# Patient Record
Sex: FEMALE | Race: WHITE | HISPANIC OR LATINO | Employment: FULL TIME | URBAN - METROPOLITAN AREA
[De-identification: names, ages, dates, MRNs, and addresses within clinical notes are randomized per-mention and may not be internally consistent; named-entity substitution may affect disease eponyms.]

---

## 2023-03-09 PROBLEM — R14.0 BLOATING: Status: ACTIVE | Noted: 2023-03-09

## 2023-03-09 PROBLEM — R53.81 MALAISE AND FATIGUE: Status: ACTIVE | Noted: 2023-03-09

## 2023-03-09 PROBLEM — G62.9 NEUROPATHY: Status: ACTIVE | Noted: 2023-03-09

## 2023-03-09 PROBLEM — G43.009 MIGRAINE WITHOUT AURA AND WITHOUT STATUS MIGRAINOSUS, NOT INTRACTABLE: Status: ACTIVE | Noted: 2023-03-09

## 2023-03-09 PROBLEM — J02.9 SORE THROAT: Status: ACTIVE | Noted: 2023-03-09

## 2023-03-09 PROBLEM — M79.2 NERVE PAIN: Status: ACTIVE | Noted: 2023-03-09

## 2023-03-09 PROBLEM — J45.909 RAD (REACTIVE AIRWAY DISEASE) (HHS-HCC): Status: ACTIVE | Noted: 2023-03-09

## 2023-03-09 PROBLEM — K21.9 GASTROESOPHAGEAL REFLUX DISEASE WITHOUT ESOPHAGITIS: Status: ACTIVE | Noted: 2023-03-09

## 2023-03-09 PROBLEM — R53.83 MALAISE AND FATIGUE: Status: ACTIVE | Noted: 2023-03-09

## 2023-03-09 PROBLEM — H10.9 CONJUNCTIVITIS, BACTERIAL: Status: ACTIVE | Noted: 2023-03-09

## 2023-03-09 PROBLEM — N39.0 UTI (URINARY TRACT INFECTION): Status: ACTIVE | Noted: 2023-03-09

## 2023-03-09 PROBLEM — R14.3 FLATULENCE: Status: ACTIVE | Noted: 2023-03-09

## 2023-03-09 RX ORDER — ALBUTEROL SULFATE 90 UG/1
1-2 AEROSOL, METERED RESPIRATORY (INHALATION)
COMMUNITY
Start: 2022-08-02

## 2023-03-09 RX ORDER — GABAPENTIN 300 MG/1
1 CAPSULE ORAL 3 TIMES DAILY
COMMUNITY
Start: 2022-08-02 | End: 2023-03-15 | Stop reason: SDUPTHER

## 2023-03-09 RX ORDER — OMEPRAZOLE 20 MG/1
1 CAPSULE, DELAYED RELEASE ORAL DAILY
COMMUNITY
Start: 2022-08-19

## 2023-03-09 RX ORDER — DESOGESTREL AND ETHINYL ESTRADIOL 0.15-0.03
1 KIT ORAL DAILY
COMMUNITY
Start: 2022-09-09 | End: 2023-08-09 | Stop reason: SDUPTHER

## 2023-03-09 RX ORDER — POLYMYXIN B SULFATE AND TRIMETHOPRIM 1; 10000 MG/ML; [USP'U]/ML
1 SOLUTION OPHTHALMIC
COMMUNITY
Start: 2022-10-17

## 2023-03-09 RX ORDER — ATOGEPANT 60 MG/1
60 TABLET ORAL DAILY
COMMUNITY
Start: 2022-08-02 | End: 2023-03-15 | Stop reason: SDUPTHER

## 2023-03-15 ENCOUNTER — OFFICE VISIT (OUTPATIENT)
Dept: PRIMARY CARE | Facility: CLINIC | Age: 28
End: 2023-03-15
Payer: COMMERCIAL

## 2023-03-15 VITALS
HEIGHT: 64 IN | BODY MASS INDEX: 26.77 KG/M2 | OXYGEN SATURATION: 99 % | HEART RATE: 93 BPM | WEIGHT: 156.8 LBS | RESPIRATION RATE: 16 BRPM | SYSTOLIC BLOOD PRESSURE: 114 MMHG | TEMPERATURE: 98.3 F | DIASTOLIC BLOOD PRESSURE: 76 MMHG

## 2023-03-15 DIAGNOSIS — R53.81 MALAISE AND FATIGUE: ICD-10-CM

## 2023-03-15 DIAGNOSIS — M77.9 TENDONITIS: ICD-10-CM

## 2023-03-15 DIAGNOSIS — G62.9 NEUROPATHY: ICD-10-CM

## 2023-03-15 DIAGNOSIS — G43.009 MIGRAINE WITHOUT AURA AND WITHOUT STATUS MIGRAINOSUS, NOT INTRACTABLE: Primary | ICD-10-CM

## 2023-03-15 DIAGNOSIS — R53.83 MALAISE AND FATIGUE: ICD-10-CM

## 2023-03-15 PROCEDURE — 99213 OFFICE O/P EST LOW 20 MIN: CPT | Performed by: FAMILY MEDICINE

## 2023-03-15 PROCEDURE — 1036F TOBACCO NON-USER: CPT | Performed by: FAMILY MEDICINE

## 2023-03-15 RX ORDER — ATOGEPANT 60 MG/1
60 TABLET ORAL DAILY
Qty: 30 TABLET | Refills: 5 | Status: SHIPPED | OUTPATIENT
Start: 2023-03-15 | End: 2023-11-01 | Stop reason: SDUPTHER

## 2023-03-15 RX ORDER — GABAPENTIN 300 MG/1
300 CAPSULE ORAL 3 TIMES DAILY
Qty: 90 CAPSULE | Refills: 5 | Status: SHIPPED | OUTPATIENT
Start: 2023-03-15

## 2023-03-15 RX ORDER — DICLOFENAC SODIUM 75 MG/1
75 TABLET, DELAYED RELEASE ORAL 2 TIMES DAILY PRN
Qty: 60 TABLET | Refills: 3 | Status: SHIPPED | OUTPATIENT
Start: 2023-03-15 | End: 2024-03-14

## 2023-03-15 ASSESSMENT — PATIENT HEALTH QUESTIONNAIRE - PHQ9
1. LITTLE INTEREST OR PLEASURE IN DOING THINGS: NOT AT ALL
SUM OF ALL RESPONSES TO PHQ9 QUESTIONS 1 AND 2: 0
2. FEELING DOWN, DEPRESSED OR HOPELESS: NOT AT ALL

## 2023-03-15 NOTE — PROGRESS NOTES
"Subjective   Patient ID: Joann Gimenez is a 27 y.o. female who presents for Migraine.    HPI  Patient presents today for a follow-up Migraines. Was taking Quilipta 60 MG but has been off for approximately 4 months d/t insurance issues. States she has no concerns with this medication. States with her daily migraines, she has been very thirsty. Denies dry mouth. This has been going on for 2 weeks. Also having urinary urge and frequency. Gets very weak, and shaky multiple times a day, and feels as if she could pass out. States she has mentioned this before.     Also reports some pain in her hip/groin area that radiates down bilateral legs.     Taking current medications which were reviewed.  Problem list discussed.    Overall doing well.  Eating okay.  Staying active.    Has no other new problem /question.    Review of Systems  Constitutional- No activity change. No appetite change.  Eyes- Denies vision changes.  Respiratory- No shortness of breath.  Cardiovascular- No palpitations. No chest pain.  GI- No nausea or vomiting. No diarrhea or constipation. Denies abdominal pain.  : Positive urinary urgency, frequency   Musculoskeletal- Denies joint swelling. Positive radiating hip/groin pain   Extremities- No edema.  Neurological- positive migraines, dizziness, weakness  Endocrine: Positive excessive thirst   Skin- No rashes.  Psychiatric/Behavioral- Denies significant anxiety, or depressed mood.    Objective     /76   Pulse 93   Temp 36.8 °C (98.3 °F)   Resp 16   Ht 1.626 m (5' 4\")   Wt 71.1 kg (156 lb 12.8 oz)   LMP 03/15/2023   SpO2 99%   BMI 26.91 kg/m²     No Known Allergies    Physical Exam     Constitutional- Well-nourished. No distress  Head- unremarkable.  Ears- TMs clear.  Eyes- PERRL.  Conjunctiva normal.  Nose- Normal.  No rhinorrhea noted.  Throat- Oropharynx is clear and moist.  Neck- Supple with no thyromegaly.  No significant cervical adenopathy noted.  Pulmonary/Chest- Breath sounds " normal with normal effort.  No wheezing.  Heart- Regular rate and rhythm.  No murmur.  Abdomen- Soft and non-tender.  No masses noted.  Musculoskeletal- Normal ROM.  No significant joint swelling  Extremities- No edema.   Neurological- Alert.  No noted deficits.  Skin- Warm.  No rashes.  Psychiatric/Behavioral- Mood and affect normal.  Behavior normal.     Assessment/Plan   Problem List Items Addressed This Visit          Nervous    Neuropathy    Relevant Medications    gabapentin (Neurontin) 300 mg capsule       Other    Malaise and fatigue    Relevant Orders    CBC and Auto Differential    Basic Metabolic Panel    TSH with reflex to Free T4 if abnormal    Migraine without aura and without status migrainosus, not intractable - Primary    Relevant Medications    atogepant (Qulipta) 60 mg tablet tablet     Other Visit Diagnoses       Tendonitis        Relevant Medications    diclofenac (Voltaren) 75 mg EC tablet          Long talk. Treatment options reviewed.     Migraines: Quilipta 40 mg once daily.     Neuropathy: Continue with gabapentin 300 mg TID. Call if after you start the Quilipta if you think you need to have dose increased.     Excessive thirst/increased urination: Blood work ordered.     Tendonitis: Start diclofenac BID.     Continue and take your medications as prescribed.    Health Maintenance issues discussed.    Importance of healthy diet and regular exercise regimen discussed.    We will contact you with any test results ordered. If you do not hear from us, please contact.    Follow-up as instructed or sooner if any problems or symptoms do not resolve as expected.     Scribe Attestation  By signing my name below, Edi KEYS, Ally   attest that this documentation has been prepared under the direction and in the presence of Mike Xiao MD.

## 2023-08-09 DIAGNOSIS — Z30.41 ENCOUNTER FOR SURVEILLANCE OF CONTRACEPTIVE PILLS: ICD-10-CM

## 2023-08-09 RX ORDER — DESOGESTREL AND ETHINYL ESTRADIOL 0.15-0.03
1 KIT ORAL DAILY
Qty: 28 TABLET | Refills: 2 | Status: SHIPPED | OUTPATIENT
Start: 2023-08-09 | End: 2023-11-27 | Stop reason: SDUPTHER

## 2023-08-09 NOTE — TELEPHONE ENCOUNTER
Rx Refill Request Telephone Encounter    Name:  Joann Gimenez  : 1995     Medication Name:  APRI  Dose (Optional):    0.15 - 0.03 TABLET  Quantity (Optional):      Directions (Optional):   1 TABLET DAILY    ALLERGIES:   ON FILE    Specific Pharmacy location:  [facility]    Date of last appointment:  03/15/23  Date of next appointment:  NONE

## 2023-11-01 DIAGNOSIS — G43.009 MIGRAINE WITHOUT AURA AND WITHOUT STATUS MIGRAINOSUS, NOT INTRACTABLE: ICD-10-CM

## 2023-11-01 RX ORDER — ATOGEPANT 60 MG/1
60 TABLET ORAL DAILY
Qty: 30 TABLET | Refills: 0 | Status: SHIPPED | OUTPATIENT
Start: 2023-11-01 | End: 2023-11-27 | Stop reason: SDUPTHER

## 2023-11-01 NOTE — TELEPHONE ENCOUNTER
Rx Refill Request Telephone Encounter    Name:  Joann Gimenez  : 1995     Medication Name:  Atogepant 60 mg  Quantity (Optional):    30  Directions (Optional):   Take 1 tablet (60 mg) by mouth once daily.     Specific Pharmacy location:  [facility]     Date of last appointment:  3/15/23  Date of next appointment:  23 ( first evening aval)

## 2023-11-27 ENCOUNTER — OFFICE VISIT (OUTPATIENT)
Dept: PRIMARY CARE | Facility: CLINIC | Age: 28
End: 2023-11-27
Payer: COMMERCIAL

## 2023-11-27 VITALS
RESPIRATION RATE: 18 BRPM | HEART RATE: 74 BPM | HEIGHT: 63 IN | WEIGHT: 147.8 LBS | SYSTOLIC BLOOD PRESSURE: 110 MMHG | TEMPERATURE: 97.7 F | OXYGEN SATURATION: 98 % | BODY MASS INDEX: 26.19 KG/M2 | DIASTOLIC BLOOD PRESSURE: 70 MMHG

## 2023-11-27 DIAGNOSIS — G43.009 MIGRAINE WITHOUT AURA AND WITHOUT STATUS MIGRAINOSUS, NOT INTRACTABLE: ICD-10-CM

## 2023-11-27 DIAGNOSIS — G62.9 NEUROPATHY: ICD-10-CM

## 2023-11-27 DIAGNOSIS — Z30.41 ENCOUNTER FOR SURVEILLANCE OF CONTRACEPTIVE PILLS: ICD-10-CM

## 2023-11-27 DIAGNOSIS — R53.81 MALAISE AND FATIGUE: ICD-10-CM

## 2023-11-27 DIAGNOSIS — R53.83 MALAISE AND FATIGUE: ICD-10-CM

## 2023-11-27 PROCEDURE — 99213 OFFICE O/P EST LOW 20 MIN: CPT | Performed by: FAMILY MEDICINE

## 2023-11-27 PROCEDURE — 1036F TOBACCO NON-USER: CPT | Performed by: FAMILY MEDICINE

## 2023-11-27 RX ORDER — ATOGEPANT 60 MG/1
60 TABLET ORAL DAILY
Qty: 90 TABLET | Refills: 1 | Status: SHIPPED | OUTPATIENT
Start: 2023-11-27

## 2023-11-27 RX ORDER — DESOGESTREL AND ETHINYL ESTRADIOL 0.15-0.03
1 KIT ORAL DAILY
Qty: 28 TABLET | Refills: 11 | Status: SHIPPED | OUTPATIENT
Start: 2023-11-27 | End: 2024-01-22

## 2023-11-27 NOTE — PROGRESS NOTES
"Subjective   Patient ID: Joann Gimenez is a 28 y.o. female who presents for Migraine.  HPI    Patient presents in office today for migraines. Admits that she has been taking Qulipta and Apri. Admits that the medications have been working for her.     Would like to talk about her birth control. Admits that she has been taking Apri for the last 12ish years.     Taking current medications which were reviewed.  Problem list discussed.    Overall doing well.  Eating okay.  Staying active.    Has no other new problem /question.     ROS  Constitutional- No activity change. No appetite change.  Eyes- Denies vision changes.  Respiratory- No shortness of breath.  Cardiovascular- No palpitations. No chest pain.  GI- No nausea or vomiting. No diarrhea or constipation. Denies abdominal pain.  Musculoskeletal- Denies joint swelling.  Extremities- No edema.  Neurological-migraines   Skin- No rashes.  Psychiatric/Behavioral- Denies significant anxiety, or depressed mood.     Objective     /70   Pulse 74   Temp 36.5 °C (97.7 °F) (Temporal)   Resp 18   Ht 1.6 m (5' 3\")   Wt 67 kg (147 lb 12.8 oz)   SpO2 98%   BMI 26.18 kg/m²     No Known Allergies    Constitutional-- Well-nourished.  No distress  Head- unremarkable.  Eyes- PERRL.  Conjunctiva normal.  Nose- Normal.  No rhinorrhea noted.  Throat- Oropharynx is clear and moist.  Neck- Supple with no thyromegaly.  No significant cervical adenopathy noted.  Pulmonary/Chest- Breath sounds normal with normal effort.  No wheezing.  Heart- Regular rate and rhythm.  No murmur.  Abdomen- Soft and non-tender.  No masses noted.  Musculoskeletal- Normal ROM.  No significant joint swelling  Extremities- No edema.   Neurological- Alert.  No noted deficits.  Skin- Warm.  No rashes.  Psychiatric/Behavioral- Mood and affect normal.  Behavior normal.     Assessment/Plan   1. Neuropathy        2. Migraine without aura and without status migrainosus, not intractable  atogepant (Qulipta) 60 " mg tablet tablet    zavegepant 10 mg/actuation spray,non-aerosol    Referral to Neurology      3. Malaise and fatigue        4. Encounter for surveillance of contraceptive pills  desogestreL-ethinyl estradioL (Apri) 0.15-0.03 mg tablet             Long talk. Treatment options reviewed.    Discussed frequent migraines.  Trial of nasal spray at onset of severe migraine.  Advised patient to follow up with Neurologist.  Use nasal spray as directed.      Discussed birth control.      Continue and take your medications as prescribed.    Health Maintenance issues discussed.    Importance of healthy diet and regular exercise regimen discussed.    We will contact you with any test results ordered. If you do not hear from us, please contact.    Follow-up as instructed or sooner if any problems or symptoms do not resolve as expected.      Scribe Attestation  By signing my name below, Adela KEYS Scribe   attest that this documentation has been prepared under the direction and in the presence of Mike Xiao MD.

## 2023-12-08 ENCOUNTER — TELEPHONE (OUTPATIENT)
Dept: PRIMARY CARE | Facility: CLINIC | Age: 28
End: 2023-12-08

## 2023-12-08 NOTE — TELEPHONE ENCOUNTER
DID A PRIOR AUTHORIZATION FOR ZAVZPRET- WE RECEIVED A FAX BACK FROM [facility] - THEY HAVE DENIED THE REQUEST FOR THE FOLLOWING REASON:    WE DENIED YOUR REQUEST BECAUSE WE DID NOT SEE WHAT WE NEED TO APPROVE THE DRUG YOU ASKED FOR.  WE MAY BE ABLE TO APPROVE THIS DRUG WHEN WE SEE CERTAIN RECORDS (DOCUMENTATION OF A TRIAL OF AND INADEQUATE RESPONSE OR INTOLERANCE TO NURTEC ORAL DISINTEGRATING TABLET.  IF THERE IS RECORD THAT YOU TRIED OR CANNOT TAKE CERTAIN OTHER DRUGS; WHY THIS REQUEST IS FOR GREATER THAN THE AMOUNT ALLOWED BY YOUR PLAN) WE BASED THIS DECISION ON YOUR HEALTH PLAN'S PRIOR AUTHORIZATION CLINICAL CRITERIA NAME ZAVZPRET.    PLEASE ADVISE.

## 2024-01-20 DIAGNOSIS — Z30.41 ENCOUNTER FOR SURVEILLANCE OF CONTRACEPTIVE PILLS: ICD-10-CM

## 2024-01-22 RX ORDER — DESOGESTREL AND ETHINYL ESTRADIOL 0.15-0.03
1 KIT ORAL DAILY
Qty: 84 TABLET | Refills: 0 | Status: SHIPPED | OUTPATIENT
Start: 2024-01-22 | End: 2024-04-16

## 2024-04-16 DIAGNOSIS — Z30.41 ENCOUNTER FOR SURVEILLANCE OF CONTRACEPTIVE PILLS: ICD-10-CM

## 2024-04-16 RX ORDER — DESOGESTREL AND ETHINYL ESTRADIOL 0.15-0.03
1 KIT ORAL DAILY
Qty: 84 TABLET | Refills: 0 | Status: SHIPPED | OUTPATIENT
Start: 2024-04-16

## 2024-07-16 ENCOUNTER — LAB (OUTPATIENT)
Dept: LAB | Facility: LAB | Age: 29
End: 2024-07-16
Payer: COMMERCIAL

## 2024-07-16 ENCOUNTER — TELEPHONE (OUTPATIENT)
Dept: PRIMARY CARE | Facility: CLINIC | Age: 29
End: 2024-07-16

## 2024-07-16 DIAGNOSIS — N91.2 AMENORRHEA: ICD-10-CM

## 2024-07-16 DIAGNOSIS — R11.0 NAUSEA: ICD-10-CM

## 2024-07-16 DIAGNOSIS — N91.2 AMENORRHEA: Primary | ICD-10-CM

## 2024-07-16 LAB
ALBUMIN SERPL BCP-MCNC: 4.4 G/DL (ref 3.4–5)
ALP SERPL-CCNC: 69 U/L (ref 33–110)
ALT SERPL W P-5'-P-CCNC: 11 U/L (ref 7–45)
ANION GAP SERPL CALC-SCNC: 10 MMOL/L (ref 10–20)
AST SERPL W P-5'-P-CCNC: 14 U/L (ref 9–39)
B-HCG SERPL-ACNC: <2 MIU/ML
BILIRUB SERPL-MCNC: 0.4 MG/DL (ref 0–1.2)
BUN SERPL-MCNC: 18 MG/DL (ref 6–23)
CALCIUM SERPL-MCNC: 9.3 MG/DL (ref 8.6–10.3)
CHLORIDE SERPL-SCNC: 106 MMOL/L (ref 98–107)
CO2 SERPL-SCNC: 26 MMOL/L (ref 21–32)
CREAT SERPL-MCNC: 0.85 MG/DL (ref 0.5–1.05)
EGFRCR SERPLBLD CKD-EPI 2021: >90 ML/MIN/1.73M*2
GLUCOSE SERPL-MCNC: 81 MG/DL (ref 74–99)
POTASSIUM SERPL-SCNC: 4.1 MMOL/L (ref 3.5–5.3)
PROT SERPL-MCNC: 7 G/DL (ref 6.4–8.2)
SODIUM SERPL-SCNC: 138 MMOL/L (ref 136–145)

## 2024-07-16 PROCEDURE — 80053 COMPREHEN METABOLIC PANEL: CPT

## 2024-07-16 PROCEDURE — 84702 CHORIONIC GONADOTROPIN TEST: CPT

## 2024-07-16 PROCEDURE — 36415 COLL VENOUS BLD VENIPUNCTURE: CPT

## 2024-07-16 NOTE — TELEPHONE ENCOUNTER
Patient is calling because she was having pregnancy symptoms of nausea and she was sick to her stomach so she went to [facility] in [place] and had urine tests for pregnancy tests and they were coming up negative.(Requested records) They don't have the ability to do blood work at that facility so they told her to call her PCP to see if you could order her blood work to get a pregnancy test with blood work. Her last appointment with you was 11/27/23. Did you need to see her first or can you order the blood work?    Please advise       Thanks      Patient's # [phone]

## 2024-07-16 NOTE — TELEPHONE ENCOUNTER
Mike Xiao MD   to Do [id number] Clerical       7/16/24 12:20 PM  Please have her go get lab work done as soon as possible.  Thanks

## 2024-07-17 ENCOUNTER — TELEPHONE (OUTPATIENT)
Dept: PRIMARY CARE | Facility: CLINIC | Age: 29
End: 2024-07-17

## 2024-07-17 NOTE — TELEPHONE ENCOUNTER
Spoke with patient - she is aware of lab test results, she is however still very nauseous and has a headache.  Wondering if she would need an appointment or what you would suggest?  Please advise.

## 2024-07-17 NOTE — TELEPHONE ENCOUNTER
Pt cannot make it tomorrow, she would like to come on Friday in the morning if possible?    Ok to schedule her with you in Friday?

## 2024-07-17 NOTE — TELEPHONE ENCOUNTER
----- Message from Mike Xiao sent at 7/17/2024  7:32 AM EDT -----  Urine pregnancy test is negative.  Labs are otherwise within normal limits.  Please let her know

## 2024-08-20 ENCOUNTER — APPOINTMENT (OUTPATIENT)
Dept: PRIMARY CARE | Facility: CLINIC | Age: 29
End: 2024-08-20
Payer: COMMERCIAL

## 2024-10-21 DIAGNOSIS — Z30.41 ENCOUNTER FOR SURVEILLANCE OF CONTRACEPTIVE PILLS: ICD-10-CM

## 2024-10-21 NOTE — TELEPHONE ENCOUNTER
Last seen 11/27/23  Medication pended if approved: patient aware she needs appt      Joann Gimenez  LAND[facility] [id number] [facility] Clinical Support Staff (supporting Mike Xiao MD)Yesterday (6:25 AM)       Good morning. Can you please re-fill my apri prescription? Can you send it to [facility] in [place].

## 2024-10-22 RX ORDER — DESOGESTREL AND ETHINYL ESTRADIOL 0.15-0.03
1 KIT ORAL DAILY
Qty: 30 TABLET | Refills: 0 | Status: SHIPPED | OUTPATIENT
Start: 2024-10-22

## 2024-12-06 ENCOUNTER — TELEPHONE (OUTPATIENT)
Dept: PRIMARY CARE | Facility: CLINIC | Age: 29
End: 2024-12-06
Payer: COMMERCIAL

## 2024-12-06 DIAGNOSIS — Z23 NEED FOR VACCINATION: ICD-10-CM

## 2024-12-06 NOTE — TELEPHONE ENCOUNTER
PATIENT IS CALLING - IS GOING INTO THE NURSING PROGRAM, NEEDS HEP B, VARICELLA AND MMR TITERS - CAN WE ORDER?  PATIENT HAS NOT SEEN YOU SINCE 11/27/23.  PLEASE ADVISE.    LOOKS LIKE SHE ESTABLISHED WITH AMBAR REILLY ON 7/30/24

## 2024-12-06 NOTE — TELEPHONE ENCOUNTER
Mike Xiao MD  Do [id number] [place] [id number] Clinical Support Staff34 minutes ago (12:28 PM)       Andrés for hep B surface antibody, varicella, measles, mumps, and rubella titers.  I have known her for years.  Please arrange and let her know.  Thanks

## 2024-12-09 ENCOUNTER — TELEPHONE (OUTPATIENT)
Dept: PRIMARY CARE | Facility: CLINIC | Age: 29
End: 2024-12-09
Payer: COMMERCIAL

## 2024-12-09 DIAGNOSIS — Z30.41 ENCOUNTER FOR SURVEILLANCE OF CONTRACEPTIVE PILLS: ICD-10-CM

## 2024-12-09 RX ORDER — DESOGESTREL AND ETHINYL ESTRADIOL 0.15-0.03
1 KIT ORAL DAILY
Qty: 30 TABLET | Refills: 0 | Status: SHIPPED | OUTPATIENT
Start: 2024-12-09

## 2024-12-09 NOTE — TELEPHONE ENCOUNTER
Last seen in November 2023, please call and schedule appointment.  Can send in short supply if needed, just let us know.  Thanks ladies!

## 2024-12-09 NOTE — TELEPHONE ENCOUNTER
Joann NAVARRETE Do [id number] [facility] Clinical Support Staff (supporting You)6 minutes ago (10:17 AM)       I do not have insurance until next month and I am out of my birth control. 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 Can you refill my prescription for this month and I can schedule an appointment once I have insurance?        You  Joann Elmore hour ago (8:49 AM)       Vu David,      We have received a refill request for one of your medications. Unfortunately our records show that you haven't been seen in the office since 11/27/23. In order to get further refills an appointment would be needed. To avoid delay in getting your medication please give our office a call at [phone] option 2 at your earliest convenience and our office staff will assist you in arranging an appointment.     Have a great day

## 2024-12-10 ENCOUNTER — TELEPHONE (OUTPATIENT)
Dept: PRIMARY CARE | Facility: CLINIC | Age: 29
End: 2024-12-10
Payer: COMMERCIAL

## 2024-12-10 NOTE — TELEPHONE ENCOUNTER
RECEIVED LAB WORK FROM [facility] - DRAWN ON 12/9/24 - ATTACHED TO THIS MESSAGE AND SCANNED IN UNDER MEDIA. PLEASE REVIEW AND ADVISE.

## 2024-12-11 NOTE — TELEPHONE ENCOUNTER
Per Dr. Xiao, the wrong test was drawn. The order was for Hep B surface antibody. A Hep B surface antigen was drawn.    I called and spoke to Radha at [facility] [employer]. She was able to add on the Hep B Surface antibody to the blood already drawn. She will change this with the patients insurance as well.       I called and LM for patient to call the office to make aware.

## 2024-12-13 NOTE — TELEPHONE ENCOUNTER
RECEIVED NEW REPORT FROM [facility].  SCANNED IN AND ATTACHED TO THIS MESSAGE.  PLEASE REVIEW AND ADVISE.

## 2024-12-13 NOTE — TELEPHONE ENCOUNTER
Mike Xiao MD  Do [id number] Clerical8 minutes ago (5:02 PM)       Please let her know her titers came back okay except her rubella titer was low.  This means she needs to come in for an MMR booster.  This is a nurse visit.  Please arrange for MMR through the nurse she can also  copies of her labs which came over separately so she will need both sets.  Please let her know       Called and lmom for patient to rtc during regular business hours  [phone]  Will also send her a "[employer] message with above message

## 2025-03-12 ENCOUNTER — OFFICE VISIT (OUTPATIENT)
Dept: URGENT CARE | Age: 30
End: 2025-03-12
Payer: COMMERCIAL

## 2025-03-12 VITALS
BODY MASS INDEX: 27.46 KG/M2 | HEIGHT: 63 IN | OXYGEN SATURATION: 97 % | WEIGHT: 155 LBS | SYSTOLIC BLOOD PRESSURE: 119 MMHG | DIASTOLIC BLOOD PRESSURE: 86 MMHG | HEART RATE: 75 BPM | RESPIRATION RATE: 20 BRPM | TEMPERATURE: 98.4 F

## 2025-03-12 DIAGNOSIS — R39.9 LOWER URINARY TRACT SYMPTOMS (LUTS): Primary | ICD-10-CM

## 2025-03-12 LAB
POC APPEARANCE, URINE: ABNORMAL
POC BILIRUBIN, URINE: NEGATIVE
POC BLOOD, URINE: NEGATIVE
POC COLOR, URINE: ABNORMAL
POC GLUCOSE, URINE: NEGATIVE MG/DL
POC KETONES, URINE: NEGATIVE MG/DL
POC LEUKOCYTES, URINE: ABNORMAL
POC NITRITE,URINE: NEGATIVE
POC PH, URINE: 6 PH
POC PROTEIN, URINE: NEGATIVE MG/DL
POC SPECIFIC GRAVITY, URINE: 1.02
POC UROBILINOGEN, URINE: 0.2 EU/DL
PREGNANCY TEST URINE, POC: NEGATIVE

## 2025-03-12 PROCEDURE — 81003 URINALYSIS AUTO W/O SCOPE: CPT | Performed by: NURSE PRACTITIONER

## 2025-03-12 PROCEDURE — 81025 URINE PREGNANCY TEST: CPT | Performed by: NURSE PRACTITIONER

## 2025-03-12 PROCEDURE — 1036F TOBACCO NON-USER: CPT | Performed by: NURSE PRACTITIONER

## 2025-03-12 PROCEDURE — 99213 OFFICE O/P EST LOW 20 MIN: CPT | Performed by: NURSE PRACTITIONER

## 2025-03-12 PROCEDURE — 3008F BODY MASS INDEX DOCD: CPT | Performed by: NURSE PRACTITIONER

## 2025-03-12 RX ORDER — NITROFURANTOIN 25; 75 MG/1; MG/1
100 CAPSULE ORAL 2 TIMES DAILY
Qty: 10 CAPSULE | Refills: 0 | Status: SHIPPED | OUTPATIENT
Start: 2025-03-12 | End: 2025-03-17

## 2025-03-12 ASSESSMENT — ENCOUNTER SYMPTOMS
COUGH: 0
FREQUENCY: 1
HEMATURIA: 0
SORE THROAT: 0
FEVER: 0
CHILLS: 0
FLANK PAIN: 0
DYSURIA: 1
ABDOMINAL PAIN: 0

## 2025-03-12 NOTE — PROGRESS NOTES
"Subjective   Patient ID: Joann Gimenez is a 29 y.o. female. They present today with a chief complaint of Female Dysuria (Burning, increase urgency and smell. Concerns present x 4 days.).    History of Present Illness  HPI    Patient presents with UTI symptoms. Reporting dysuria, frequency, urgency, odor for the past 4 days. Denies abdominal pain, denies flank pain. No fever/chills. She has not taken anything for the symptoms.     Past Medical History  Allergies as of 03/12/2025    (No Known Allergies)       (Not in a hospital admission)       No past medical history on file.    Past Surgical History:   Procedure Laterality Date    HIP SURGERY          reports that she has never smoked. She has never used smokeless tobacco.    Review of Systems  Review of Systems   Constitutional:  Negative for chills and fever.   HENT:  Negative for congestion and sore throat.    Respiratory:  Negative for cough.    Cardiovascular:  Negative for chest pain.   Gastrointestinal:  Negative for abdominal pain.   Genitourinary:  Positive for dysuria, frequency and urgency. Negative for flank pain, hematuria and pelvic pain.         Objective    Vitals:    03/12/25 1849   BP: 119/86   Pulse: 75   Resp: 20   Temp: 36.9 °C (98.4 °F)   TempSrc: Oral   SpO2: 97%   Weight: 70.3 kg (155 lb)   Height: 1.6 m (5' 3\")     Patient's last menstrual period was 03/05/2025.    Physical Exam  Constitutional:       General: She is not in acute distress.     Appearance: Normal appearance. She is not ill-appearing or toxic-appearing.   HENT:      Head: Normocephalic and atraumatic.      Right Ear: Hearing and external ear normal.      Left Ear: Hearing and external ear normal.      Nose: Nose normal.      Mouth/Throat:      Lips: Pink.      Mouth: Mucous membranes are moist.   Cardiovascular:      Rate and Rhythm: Normal rate and regular rhythm.      Heart sounds: Normal heart sounds.   Pulmonary:      Effort: Pulmonary effort is normal. No respiratory " distress.      Breath sounds: Normal breath sounds.   Abdominal:      Tenderness: There is no abdominal tenderness. There is no right CVA tenderness or left CVA tenderness.   Skin:     General: Skin is warm and dry.   Neurological:      General: No focal deficit present.      Mental Status: She is alert.   Psychiatric:         Attention and Perception: Attention normal.         Mood and Affect: Mood normal.         Speech: Speech normal.         Behavior: Behavior normal.         Procedures    Point of Care Test & Imaging Results from this visit  Results for orders placed or performed in visit on 03/12/25   POCT UA Automated manually resulted   Result Value Ref Range    POC Color, Urine Straw Straw, Yellow, Light-Yellow    POC Appearance, Urine Hazy (A) Clear    POC Glucose, Urine NEGATIVE NEGATIVE mg/dl    POC Bilirubin, Urine NEGATIVE NEGATIVE    POC Ketones, Urine NEGATIVE NEGATIVE mg/dl    POC Specific Gravity, Urine 1.025 1.005 - 1.035    POC Blood, Urine NEGATIVE NEGATIVE    POC PH, Urine 6.0 No Reference Range Established PH    POC Protein, Urine NEGATIVE NEGATIVE mg/dl    POC Urobilinogen, Urine 0.2 0.2, 1.0 EU/DL    Poc Nitrite, Urine NEGATIVE NEGATIVE    POC Leukocytes, Urine TRACE (A) NEGATIVE   POCT pregnancy, urine manually resulted   Result Value Ref Range    Preg Test, Ur Negative Negative      No results found.    Diagnostic study results (if any) were reviewed by LUIS Goodson.    Assessment/Plan   Allergies, medications, history, and pertinent labs/EKGs/Imaging reviewed by LUIS Goodson.     Medical Decision Making  UA positive for leukocytes. Starting on Macrobid and sending urine for culture.   At time of discharge patient was clinically well-appearing and HDS for outpatient management. She was educated regarding diagnosis, supportive care, OTC and Rx medications. She was given the opportunity to ask questions prior to discharge.  They verbalized understanding of my  discussion of the plans for treatment, expected course, indications to return to  or seek further evaluation in ED, and the need for timely follow up as directed.       Orders and Diagnoses  Diagnoses and all orders for this visit:  Lower urinary tract symptoms (LUTS)  -     POCT UA Automated manually resulted  -     Urine Culture  -     nitrofurantoin, macrocrystal-monohydrate, (Macrobid) 100 mg capsule; Take 1 capsule (100 mg) by mouth 2 times a day for 5 days.  -     POCT pregnancy, urine manually resulted      Medical Admin Record      Patient disposition: Home    Electronically signed by LUIS Goodson  7:30 PM

## 2025-03-14 LAB — BACTERIA UR CULT: ABNORMAL

## 2025-03-27 ENCOUNTER — CLINICAL SUPPORT (OUTPATIENT)
Dept: URGENT CARE | Age: 30
End: 2025-03-27
Payer: COMMERCIAL

## 2025-03-27 VITALS
BODY MASS INDEX: 27.46 KG/M2 | HEIGHT: 63 IN | OXYGEN SATURATION: 99 % | WEIGHT: 155 LBS | HEART RATE: 87 BPM | SYSTOLIC BLOOD PRESSURE: 123 MMHG | DIASTOLIC BLOOD PRESSURE: 76 MMHG | RESPIRATION RATE: 18 BRPM | TEMPERATURE: 98.1 F

## 2025-03-27 DIAGNOSIS — Z02.1 ENCOUNTER FOR PRE-EMPLOYMENT HEALTH SCREENING EXAMINATION: Primary | ICD-10-CM

## 2025-03-27 PROCEDURE — 3008F BODY MASS INDEX DOCD: CPT | Performed by: PHYSICIAN ASSISTANT

## 2025-03-27 PROCEDURE — 86580 TB INTRADERMAL TEST: CPT | Performed by: PHYSICIAN ASSISTANT

## 2025-03-27 PROCEDURE — 99211 OFF/OP EST MAY X REQ PHY/QHP: CPT | Performed by: PHYSICIAN ASSISTANT

## 2025-03-27 NOTE — PROGRESS NOTES
PPD Placement note  Joann Gimenez, 29 y.o. female is here today for placement of PPD test  Reason for PPD test: PPD placement  Pt taken PPD test before: yes  Verified in allergy area and with patient that they are not allergic to the products PPD is made of (Phenol or Tween). Yes  Is patient taking any oral or IV steroid medication now or have they taken it in the last month? no  Has the patient ever received the BCG vaccine?: no  Has the patient been in recent contact with anyone known or suspected of having active TB disease?: no       Date of exposure (if applicable): n/a       Name of person they were exposed to (if applicable): n/a  Patient's Country of origin?: [place]  O: Alert and oriented in NAD.  P:  PPD placed on 3/27/2025.  Patient advised to return for reading within 48-72 hours.

## 2025-03-29 DIAGNOSIS — Z30.41 ENCOUNTER FOR SURVEILLANCE OF CONTRACEPTIVE PILLS: ICD-10-CM

## 2025-03-30 LAB
INDURATION: 0 MM
TB SKIN TEST: NEGATIVE

## 2025-03-31 NOTE — TELEPHONE ENCOUNTER
Last seen 11/27/23. Patient needs an appointment. Please call to schedule, and let us know if a short supply is needed. Thank you!

## 2025-04-05 RX ORDER — DESOGESTREL AND ETHINYL ESTRADIOL 0.15-0.03
1 KIT ORAL DAILY
Qty: 84 TABLET | OUTPATIENT
Start: 2025-04-05